# Patient Record
(demographics unavailable — no encounter records)

---

## 2025-07-23 NOTE — HISTORY OF PRESENT ILLNESS
[FreeTextEntry1] : 40 y/o female for annual;  + yeast infection, no fever, no abd pain [N] : Patient does not use contraception [Mammogramdate] : will turn 40 [PapSmeardate] : 22